# Patient Record
(demographics unavailable — no encounter records)

---

## 2025-04-10 NOTE — HISTORY OF PRESENT ILLNESS
[FreeTextEntry1] : Patient presents with right arm pain that began last Thursday. Reports pain radiating from neck down the right arm. Patient believes the symptoms are related to neck issues. Denies any specific trauma or injury but mentions occasionally lifting heavy water containers. Reports intermittent neck pain with turning and poor sleep quality. Also complains of low back pain without radiation to legs. Pain is currently worse than usual. 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 No previous history of significant neck problems. Current medications include Synthroid and Losartan for blood pressure management.

## 2025-04-10 NOTE — PHYSICAL EXAM

## 2025-04-10 NOTE — ASSESSMENT
[FreeTextEntry1] : 1. Cervical Radiculopathy, suspected - Ordered cervical spine MRI - Ordered cervical spine X-rays with flexion/extension views - Ordered EMG/NCS of upper extremities - Prescribed Gabapentin, starting with once daily dosing at night, may increase to TID as tolerated - Referred to physical therapy  2. Low Back Pain - Ordered lumbar spine X-rays - Will consider lumbar MRI based on clinical course and response to therapy  Total clinician time spent  is  46 minutes including preparing to see the patient, obtaining and/or reviewing and confirming history, performing a medically necessary and appropriate examination, counseling and educating the patient and/or family, documenting clinical information in the HER and communicating and/or referring to other healthcare professionals.

## 2025-05-12 NOTE — REVIEW OF SYSTEMS
[address] ENCOUNTER        Pt Name: Kaiden Chambers  MRN: [id number]  [address] [facility] [place] 1952  Date of evaluation: 9/5/2023  Provider: Brando Diaz DO  PCP: Madison Peña DO  Note Started: 6:12 AM EDT 9/5/23    CHIEF COMPLAINT       Chief Complaint   Patient presents with    Ear Fullness     For approx a week c/o ear feeling \"clogged\" , since 2 days ago c/o pain Rt ear and Rt sinus congestion       HISTORY OF PRESENT ILLNESS: 1 or more Elements   History From: Patient    Limitations to history : None    Kaiden Chambers is a 79 y.o. male who presents to the emergency department with chief complaint of right ear fullness. Patient states that has been going on for the past 24 to 48 hours where he has been having generalized clogged sensation in the right ear. He states he has been putting Debrox, water, Q-tips, and cotton balls in the ear and attempt to clear his right ear and has not had significant improvement. He also states that he has mild congestion along with the symptoms. Nothing seems to make the symptoms any better or worse otherwise. Patient denies any lightheadedness or dizziness and denies any fevers or chills. Patient also has not had any nausea vomiting, chest pain, shortness of breath, abdominal pain, hematuria or dysuria, constipation or diarrhea. Nursing Notes were all reviewed and agreed with or any disagreements were addressed in the HPI. REVIEW OF SYSTEMS :      Positives and Pertinent negatives as per HPI. PAST MEDICAL HISTORY/Chronic Conditions Affecting Care      has a past medical history of Dementia ([facility]), Depression, Diabetes mellitus ([facility]), Hypertension, and Neuropathy.      SURGICAL HISTORY     Past Surgical History:   Procedure Laterality Date    BACK SURGERY      FOOT DEBRIDEMENT Left 3/25/2021    BONE BIOPSY AND CULTURE, GREAT TOE LEFT FOOT performed by Brittni Hamm DPM at [address] [place] [address] [Negative] : Heme/Lymph

## 2025-05-12 NOTE — ASSESSMENT
[FreeTextEntry1] : 58 yr old female with right Cervical Radiculopathy and chronic low back pain. XRAY and MRI of the Lumbar and Cervical Spine reviewed as noted above. EMG UE reveals right C5-6 neuropathic dysfunction. Patient states her symptoms have improved since last visit with only 3 days use of gabapentin. Patient will begin Pt for neck and we discussed modes of conservative management including activity modifications HEP, NSIADs heat and ice. Declines  pain management as this time. Will RTO in 4months for re-evaluation. Can refer to pain management if symptoms persist or worsen.   Supervising Physician : Javid Zafar, DO

## 2025-05-12 NOTE — PHYSICAL EXAM

## 2025-05-12 NOTE — HISTORY OF PRESENT ILLNESS
[FreeTextEntry1] : Original Presentation : Patient presents with right arm pain that began last Thursday. Reports pain radiating from neck down the right arm. Patient believes the symptoms are related to neck issues. Denies any specific trauma or injury but mentions occasionally lifting heavy water containers. Reports intermittent neck pain with turning and poor sleep quality. Also complains of low back pain without radiation to legs. Pain is currently worse than usual. 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 No previous history of significant neck problems. Current medications include Synthroid and Losartan for blood pressure management.   Diagnostic Testing :  MRI Cervical Spine 4.23.25 : Multilevel degenerative changes result in varying degrees of ventral thecal sac narrowing from C3-C4 through C6-7 And T2-3 with severe right neural foraminal narrowing at C5-6.  MRI Lumbar spine :4.23.25 : Mild degenerative changes in the lower lumbar spine including facet arthropathy. No lumbar spinal canal or neural foraminal stenosis.  XR L spine : mild multilevel degenerative disc disease and mild lower lumbar facet arthritis. No acute abnormality XR C spine : Degenerative disc changes C4-5 C5-6 C6-7 aith endplate irregularities and small moderate anterior osteophytes. No fracture of additional alignment abnormality. Oblique view demonstrates facet and uncovertebral arthritis with mild osseous neuroforaminal narrowing C5-6 and C6-7 b/l .  EMG Upper Extremities : Neuropathic dysfunction in the right C5-6 nerve root distribution   Today : Today I had the pleasure of seeing Ms. ONEIL in our office for follow up.  Their past medical history and imaging have been reviewed.    David is a 58 year old female who first presented to our office for evaluation of right sided cervical radicular pain as well as chronic low back pain without radiculopathy. Today we reviewed her XRAys of the cervical and lumbar spine as well as MRI imaging of the cervical and lumbar spine noted above. EMG testing of the UE revealed Neuropathic dysfunction in the right C5-6 nerve root distribution. She has not started physical therapy for her neck or back but is happy to report improvement in her neck and back pain since her last visit.  She was prescribed gabapentin 300mg TID PRN and has only used it x 3 days and states she will only continue if her symptoms are to recur. she denies recent trauma, upper or lower extremity weakness as well as any saddle anesthesia or urinary / bowel incontinence or retention.